# Patient Record
(demographics unavailable — no encounter records)

---

## 2025-02-07 NOTE — PHYSICAL EXAM
[FreeTextEntry1] : Healthy appearing 15 year-old child.  Flat affect Eyes are clear with no sclera abnormalities. External ears, nose and mouth are clear.  Good respiratory effort with no audible wheezing without use of a stethoscope. Ambulates independently with no evidence of antalgia. Good coordination and balance. Able to get on and off exam table without difficulty.  Spine: Inspection of the skin reveals no cafe au lait spots or large birth marks. From behind, patient is well centered with head and shoulders appropriately aligned with pelvis.  Positive poor posture noted on observation however this corrects in the superman position. Left greater than right shoulder asymmetry. Left flank crease noted. On [place] Forward Bending exam there is a right-sided thoracolumbar rib hump deformity noted. Positive increase thoracic kyphotic curve noted.  NTTP over spinous processes and paraspinal musculature. Full range of motion at cervical, thoracic and lumbar spine with no pain or difficulty. No pelvic obliquity. No LLD

## 2025-02-07 NOTE — DATA REVIEWED
[de-identified] : PA Scoliosis x rays were ordered, done and then independently reviewed today OOB on 2/7/25: T3-T9, 22 degrees.  T10-L3, 43 degrees Risser (5).  The triradiate cartilage is closed. Guevara 7. No congenital abnormalities. No Pelvic obliquity.   Lat Scoliosis x rays were ordered, done and then independently reviewed today OOB on 2/7/25: Thoracic kyphotic curvature of 50 deg. No Scheuermann's kyphosis noted. No spondylolisthesis or spondylolysis. No compression fractures or vertebral wedging.   Right bending films T3-T10, 15 degrees with left bending films T3-T10 4 degrees obtained on 2/7/2025  Scoliosis X-rays AP and lateral were ordered, done and then independently reviewed today 10/04/24: X-rays in OOB were obtained. Curvature 45 degrees T9-L2. 69 degrees of thoracic kyphosis. No spondylolisthesis or spondylosis noted. Disc spaces equal throughout the spine.  No pelvic inequity noted. Risser 4   Scoliosis X-rays AP and lateral were ordered, done and then independently reviewed today 4/19/24: X-rays in OOB were obtained. Curvature 39.5 degrees T9-L2. 69 degrees of thoracic kyphosis. No spondylolisthesis or spondylosis noted. Disc spaces equal throughout the spine.  No pelvic inequity noted. Risser 4   Scoliosis X-rays AP and lateral were ordered, done IN BRACE and then independently reviewed today 4/19/24: Curvature 5 degrees T9-L2. 58 degrees of thoracic kyphosis. No spondylolisthesis or spondylosis noted. Disc spaces equal throughout the spine.  No pelvic inequity noted. Risser 4. Adequate correction in brace.   PA scoliosis x rays obtained OOB ordered, done and independently reviewed 12/8/23: T10-L3, 43 deg Right. Risser 4. Guevara 4.  Scoliosis X-rays AP and lateral were ordered, done and then independently reviewed today 10/27/23: X-rays in OOB were obtained. Curvature 46.6 degrees T10-L3. Normal kyphosis and lordosis on lateral films. No spondylolisthesis or spondylosis noted. Disc spaces equal throughout the spine.  No pelvic inequity noted.   Scoliosis X-rays AP and lateral were ordered, done and then independently reviewed today 02/17/2023. X-rays in brace were obtained. Curvature 15.8 degrees in brace. Normal kyphosis and lordosis on lateral films. No spondylolisthesis or spondylosis noted. Disc spaces equal throughout the spine.  No pelvic inequity noted.   My interpretation and review of images taken today, 12/14/2022, in office: AP/Lat scoliosis obtained and reviewed today with family. There is a 32-degree curvature T10-L3 noted on AP films. Risser 0. Guevara 3. Normal kyphosis and lordosis on lateral. No spondylolysis or spondylolisthesis noted.

## 2025-02-07 NOTE — DATA REVIEWED
[de-identified] : PA Scoliosis x rays were ordered, done and then independently reviewed today OOB on 2/7/25: T3-T9, 22 degrees.  T10-L3, 43 degrees Risser (5).  The triradiate cartilage is closed. Guevara 7. No congenital abnormalities. No Pelvic obliquity.   Lat Scoliosis x rays were ordered, done and then independently reviewed today OOB on 2/7/25: Thoracic kyphotic curvature of 50 deg. No Scheuermann's kyphosis noted. No spondylolisthesis or spondylolysis. No compression fractures or vertebral wedging.   Right bending films T3-T10, 15 degrees with left bending films T3-T10 4 degrees obtained on 2/7/2025  Scoliosis X-rays AP and lateral were ordered, done and then independently reviewed today 10/04/24: X-rays in OOB were obtained. Curvature 45 degrees T9-L2. 69 degrees of thoracic kyphosis. No spondylolisthesis or spondylosis noted. Disc spaces equal throughout the spine.  No pelvic inequity noted. Risser 4   Scoliosis X-rays AP and lateral were ordered, done and then independently reviewed today 4/19/24: X-rays in OOB were obtained. Curvature 39.5 degrees T9-L2. 69 degrees of thoracic kyphosis. No spondylolisthesis or spondylosis noted. Disc spaces equal throughout the spine.  No pelvic inequity noted. Risser 4   Scoliosis X-rays AP and lateral were ordered, done IN BRACE and then independently reviewed today 4/19/24: Curvature 5 degrees T9-L2. 58 degrees of thoracic kyphosis. No spondylolisthesis or spondylosis noted. Disc spaces equal throughout the spine.  No pelvic inequity noted. Risser 4. Adequate correction in brace.   PA scoliosis x rays obtained OOB ordered, done and independently reviewed 12/8/23: T10-L3, 43 deg Right. Risser 4. Guevara 4.  Scoliosis X-rays AP and lateral were ordered, done and then independently reviewed today 10/27/23: X-rays in OOB were obtained. Curvature 46.6 degrees T10-L3. Normal kyphosis and lordosis on lateral films. No spondylolisthesis or spondylosis noted. Disc spaces equal throughout the spine.  No pelvic inequity noted.   Scoliosis X-rays AP and lateral were ordered, done and then independently reviewed today 02/17/2023. X-rays in brace were obtained. Curvature 15.8 degrees in brace. Normal kyphosis and lordosis on lateral films. No spondylolisthesis or spondylosis noted. Disc spaces equal throughout the spine.  No pelvic inequity noted.   My interpretation and review of images taken today, 12/14/2022, in office: AP/Lat scoliosis obtained and reviewed today with family. There is a 32-degree curvature T10-L3 noted on AP films. Risser 0. Guevara 3. Normal kyphosis and lordosis on lateral. No spondylolysis or spondylolisthesis noted.

## 2025-02-07 NOTE — ASSESSMENT
[FreeTextEntry1] : Jimmy is a 15 year-old boy with AIS with an operative 43 degree lumbar curve, currently being treated in a low profile TLSO brace.    The condition, natural history, and prognosis were explained to the family. Today's visit included obtaining the history from the child and parent, due to the child's age, the child could not be considered a reliable historian, requiring the parent to act as an independent historian. The clinical findings and images were reviewed with the family. His current curve is measuring 45 degrees, curve progression noted from previous x-rays.  Bracing is no longer an option and is unable to change natural progression.  I recommended surgery to prevent further curve progression. Curves measuring greater than 45 to 50 continue to progress into adult life at about 1-2 per year.   Preoperative and postoperative instructions reviewed. Risks and complications discussed. All questions answered, understanding verbalized. Parent and patient in agreement with plan of care.  Surgery will entail spinal fusion with instrumentation, osteotomies, cell saver, multimodality spinal cord monitoring, bone grafting (autograft/allograft.). Thoracoplasty, navigated guidance vs fluoroscopic guidance, and complex wound closure is planned. Perioperative plan discussed. X-rays of patients operated on by me in the past were shown. All risks and complications including, but not limited to, infection, nonunion, implant failure, surgery, less than full correction, paralysis explained. Wake up test discussed. Transfusion risk discussed. Neuromonitoring explained. Rib resection possibility discussed. Use of fluoroscopy and Air navigation explained infection prevention steps discussed. Post-op pain management protocol discussed. Intraspinal Duramorph discussed. All questions answered. Benefits and alternatives explained. Hospital stay usually is 3-4 days with one night in the PICU. We have implemented a rapid recovery pathway that incorporates microdose of intraspinal Duramorph at the time of surgery, which eliminates the need for opioid PCA and decreases opioids needed post-op to maintain contact. It also decreases constipation rate and allows patients resumption of diet on the same day of surgery. Pain management is in collaboration with pediatric pain specialist. We have decided intraoperative and postoperative pediatric spine team that includes pediatric spine anesthesiologist, neurologist for intraoperative real-time spinal cord monitoring to increase the safety of surgery, dedicated surgical team, pediatric hospitalist, , and  along with child life team. This approach has allowed optimal management of patients, increased surgical safety, decreased risk of blood transfusion, decreased need for opioids and allows them to go home earlier. At discharge, patient is able to walk and climb stairs independently and we arrange for revisiting nurse services for home care. Sutures are dissolvable and wound closure is done by plastic surgery, which decreased risk of infection. We also utilize intraoperative low-dose CT scan to ensure accuracy of implants. In addition, we perform multimodality spinal cord monitoring in real time which is supervised by neurophysiologist and neurologist to decrease the risk of neurological injury and improve safety of the surgical procedure. This approach has improved surgical safety, accuracy, and efficiency, thereby ensuring superior patient outcomes. In addition, the children's [facility] is the only [place] certified children[facility] hospital in [facility], ensuring the highest level of nursing care. Mother wants to proceed with surgery in next June/July 2025.   We also stressed the importance of wearing his brace at least 10 hours a day. We anticipate on doing surgery on 7/15, PSF with instrumentation, likely T4-L3. Heis 5'6" at 144lbs.

## 2025-02-07 NOTE — END OF VISIT
[FreeTextEntry3] :     Saw and examined patient; the above is an accurate documentation of my words and actions.   Barb Muhammad MD [facility] Pediatric Orthopedic Surgery    [Time Spent: ___ minutes] : I have spent [unfilled] minutes of time on the encounter which excludes teaching and separately reported services.

## 2025-02-07 NOTE — END OF VISIT
[FreeTextEntry3] :     Saw and examined patient; the above is an accurate documentation of my words and actions.   Barb Muhammad MD [facility] Pediatric Orthopedic Surgery    [Time Spent: ___ minutes] : I have spent [unfilled] minutes of time on the encounter which excludes teaching and separately reported services.

## 2025-02-07 NOTE — HISTORY OF PRESENT ILLNESS
[FreeTextEntry1] : Jimmy is a 15-year-old young man who returns today for follow up evaluation of scoliosis.  He has previously been seen by my partner Dr. Hartley for scoliosis on October 15, 2020.  At that time, he had a thoracolumbar curvature measuring 16 degrees.  On 12/14/22 he was found to have a 32-degree curve and was indicated for a TLSO brace.   He was last seen on 04/19/2024. He has been wearing TLSO brace approximately 10 hours a day.  He denies back pain. He denies any family history of scoliosis. He denies radiating pain/numbness or tingling, weakness in his upper and lower extremities. He denies urinary/bowel incontinence.  Here today for orthopedic evaluation.  Please refer to last note from previous treatment and further details.  Today, Jimmy presents to the office with his parents for follow-up of scoliosis.  His largest curve was 45 degrees on the previous visit.  He is currently wearing his TLSO brace 10 hours a day with no complications.  He denies back pain.  He presents today for repeat exam and x-rays out of the brace.  He has not worn his brace for over 24 hours.  The patient's HPI was reviewed thoroughly with patient and parent. The patient's parent has acted as an independent historian regarding the above information due to the unreliable nature of the history obtained from the patient.

## 2025-04-16 NOTE — HISTORY OF PRESENT ILLNESS
[FreeTextEntry1] : Jimmy is a 15-year-old young man with history of autism spectrum disorder. He returns today for follow up evaluation of scoliosis.  He is scheduled for posterior spinal fusion with instrumentation on 7/8/2025 with Dr. Muhammad.  He presents today for preoperative evaluation as well as full spine MRI results.  He was previously treated with a TLSO. He denies back pain.  He denies extremity numbness, tingling, weakness, bowel or bladder dysfunction.  He also did preoperative pulmonary function testing but had difficulty with completing test due to his underlying history of autism.

## 2025-04-16 NOTE — DATA REVIEWED
[de-identified] : MRI cervical, thoracic, lumbar spine, independently reviewed : No evidence of intraspinal abnormalities  PA Scoliosis x rays were ordered, done and then independently reviewed today OOB on 2/7/25: T3-T9, 22 degrees.  T10-L3, 45 degrees Risser (5).  The triradiate cartilage is closed. Guevara 7. No congenital abnormalities. No Pelvic obliquity.   Lat Scoliosis x rays were ordered, done and then independently reviewed today OOB on 2/7/25: Thoracic kyphotic curvature of 50 deg. No Scheuermann's kyphosis noted. No spondylolisthesis or spondylolysis. No compression fractures or vertebral wedging.   Right bending films T3-T10, 15 degrees with left bending films T3-T10 4 degrees obtained on 2/7/2025  Scoliosis X-rays AP and lateral were ordered, done and then independently reviewed today 10/04/24: X-rays in OOB were obtained. Curvature 45 degrees T9-L2. 69 degrees of thoracic kyphosis. No spondylolisthesis or spondylosis noted. Disc spaces equal throughout the spine.  No pelvic inequity noted. Risser 4   Scoliosis X-rays AP and lateral were ordered, done and then independently reviewed today 4/19/24: X-rays in OOB were obtained. Curvature 39.5 degrees T9-L2. 69 degrees of thoracic kyphosis. No spondylolisthesis or spondylosis noted. Disc spaces equal throughout the spine.  No pelvic inequity noted. Risser 4   Scoliosis X-rays AP and lateral were ordered, done IN BRACE and then independently reviewed today 4/19/24: Curvature 5 degrees T9-L2. 58 degrees of thoracic kyphosis. No spondylolisthesis or spondylosis noted. Disc spaces equal throughout the spine.  No pelvic inequity noted. Risser 4. Adequate correction in brace.   PA scoliosis x rays obtained OOB ordered, done and independently reviewed 12/8/23: T10-L3, 43 deg Right. Risser 4. Guevara 4.  Scoliosis X-rays AP and lateral were ordered, done and then independently reviewed today 10/27/23: X-rays in OOB were obtained. Curvature 46.6 degrees T10-L3. Normal kyphosis and lordosis on lateral films. No spondylolisthesis or spondylosis noted. Disc spaces equal throughout the spine.  No pelvic inequity noted.   Scoliosis X-rays AP and lateral were ordered, done and then independently reviewed today 02/17/2023. X-rays in brace were obtained. Curvature 15.8 degrees in brace. Normal kyphosis and lordosis on lateral films. No spondylolisthesis or spondylosis noted. Disc spaces equal throughout the spine.  No pelvic inequity noted.   My interpretation and review of images taken today, 12/14/2022, in office: AP/Lat scoliosis obtained and reviewed today with family. There is a 32-degree curvature T10-L3 noted on AP films. Risser 0. Guevara 3. Normal kyphosis and lordosis on lateral. No spondylolysis or spondylolisthesis noted.

## 2025-04-16 NOTE — ASSESSMENT
[FreeTextEntry1] : Jimmy is a 15 year-old boy with AIS with an operative 44 degree lumbar curve, currently being treated in a low profile TLSO brace.    The condition, natural history, and prognosis were explained to the family. Today's visit included obtaining the history from the child and parent, due to the child's age, the child could not be considered a reliable historian, requiring the parent to act as an independent historian. The clinical findings and images were reviewed with the family.   Patient is 15 years of age, and skeletally mature.  Bracing is not effective with curves of this magnitude or after skeletal maturity.  He is scheduled for posterior spinal fusion with instrumentation on 7/8/2025.  This curve will likely continue to progress.  It is already a large curve.  The options of surgery were discussed at length.  Mother does understand curve larger than 50, based on natural history, tend to progress 1-2 /1 in adult life. Curves 90 or more can cause significant cardiac and pulmonary compromise. Large curves are likely at risk for back pain, arthritis in adult life.   Surgical procedure discussed at length. Surgery including spine fusion with instrumentation, osteotomies, Cell Saver, multimodal spinal cord monitoring, bone grafting (autograft/allograft ), thoracoplasty, , and navigated guidance versus fluoroscopic guidance and complex wound closure is planned. Perioperative plan discussed. Wakeup test discussed. Transfusion risk discussed. Neural monitoring explained. Possible need for rib resection has been discussed. Use of fluoroscopy and AIRO navigation explained. Infection prevention steps discussed. Postoperative pain management protocol discussed. Intraspinal Duramorph discussed. Preoperative and postoperative instructions reviewed. Hospital day is usually about 3-4 days with one night in the PICU. We have implemented a rapid recovery pathway that incorporates a micro-dose of intraspinal Duramorph at the time of surgery which eliminate the need for opioid PCA and decreases opioid needs postoperatively for pain control. This also decreases constipation rate and allows patients to  resume diet on the same day of surgery. Pain management is done in collaboration with a pediatric pain specialist. We have a dedicated intraoperative and postoperative pediatric spine team that includes pediatric spine anesthesiologists, neurologist for intraoperative or real-time spinal cord monitoring to increase the safety of surgery, dedicated surgical team, pediatric hospitalist,  and  along with child life therapists. This approach has allowed for optimal management of patient's, increased surgical safety, decrease risk of blood transfusion, decreased need for opioid and allows for patient to be discharged to home earlier. At discharge the patient is able to walk and climb stairs independently. We will arrange for visiting nurse services for home care as needed. Sutures are dissolvable and wound closure was done by plastic surgery which decreases the risk of infection. We also utilized intraoperative low-dose CT scan to ensure accuracy of spinal implants. In addition we perform multimodal spinal cord monitoring and real-time which is supervised by neurophysiologist and neurologists to decrease the risk of neurological injury and improve safety of the surgical procedure. This approach has improved surgical safety, accuracy and efficiency thereby ensuring superior patient now comes. In addition [facility] is the only [place] certified [facility] in [facility],  ensuring the highest level of nursing care.   All the risks and complications of surgery including the risk of infection, nonunion, implant failure, complete paralysis, incomplete paralysis, bladder/bowel paralysis, organ injury, vascular injury, mortality, CSF leak, pleural leak, decompensation, resurgery, extension of fusion, junctional kyphosis, arthritis, organ injury, vascular injury, mortality, screw misplacement, and need for screw removal were explained.  He will return for preoperative consultation with Dr. Muhammad.  All questions were answered.  Understanding verbalized.   We spent 30 minutes on HPI, Clinical exam, ordering/ reviewing all imaging, reviewing any existing record, reviewing findings and counseling patient to treatment, differentials,etiology, prognosis, natural history, implications on ADLs, activities limitations/modifications, genetics, answering questions and addressing concerns, treatment goals and documenting in the EHR.  This note was generated using Dragon medical dictation software. A reasonable effort has been made for proofreading its contents, but typos may still remain. If there are any questions or points of clarification needed please do not hesitate to contact my office.

## 2025-04-16 NOTE — ASSESSMENT
[FreeTextEntry1] : Jimmy is a 15 year-old boy with AIS with an operative 44 degree lumbar curve, currently being treated in a low profile TLSO brace.    The condition, natural history, and prognosis were explained to the family. Today's visit included obtaining the history from the child and parent, due to the child's age, the child could not be considered a reliable historian, requiring the parent to act as an independent historian. The clinical findings and images were reviewed with the family.   Patient is 15 years of age, and skeletally mature.  Bracing is not effective with curves of this magnitude or after skeletal maturity.  He is scheduled for posterior spinal fusion with instrumentation on 7/8/2025.  This curve will likely continue to progress.  It is already a large curve.  The options of surgery were discussed at length.  Mother does understand curve larger than 50, based on natural history, tend to progress 1-2 /1 in adult life. Curves 90 or more can cause significant cardiac and pulmonary compromise. Large curves are likely at risk for back pain, arthritis in adult life.   Surgical procedure discussed at length. Surgery including spine fusion with instrumentation, osteotomies, Cell Saver, multimodal spinal cord monitoring, bone grafting (autograft/allograft ), thoracoplasty, , and navigated guidance versus fluoroscopic guidance and complex wound closure is planned. Perioperative plan discussed. Wakeup test discussed. Transfusion risk discussed. Neural monitoring explained. Possible need for rib resection has been discussed. Use of fluoroscopy and AIRO navigation explained. Infection prevention steps discussed. Postoperative pain management protocol discussed. Intraspinal Duramorph discussed. Preoperative and postoperative instructions reviewed. Hospital day is usually about 3-4 days with one night in the PICU. We have implemented a rapid recovery pathway that incorporates a micro-dose of intraspinal Duramorph at the time of surgery which eliminate the need for opioid PCA and decreases opioid needs postoperatively for pain control. This also decreases constipation rate and allows patients to  resume diet on the same day of surgery. Pain management is done in collaboration with a pediatric pain specialist. We have a dedicated intraoperative and postoperative pediatric spine team that includes pediatric spine anesthesiologists, neurologist for intraoperative or real-time spinal cord monitoring to increase the safety of surgery, dedicated surgical team, pediatric hospitalist,  and  along with child life therapists. This approach has allowed for optimal management of patient's, increased surgical safety, decrease risk of blood transfusion, decreased need for opioid and allows for patient to be discharged to home earlier. At discharge the patient is able to walk and climb stairs independently. We will arrange for visiting nurse services for home care as needed. Sutures are dissolvable and wound closure was done by plastic surgery which decreases the risk of infection. We also utilized intraoperative low-dose CT scan to ensure accuracy of spinal implants. In addition we perform multimodal spinal cord monitoring and real-time which is supervised by neurophysiologist and neurologists to decrease the risk of neurological injury and improve safety of the surgical procedure. This approach has improved surgical safety, accuracy and efficiency thereby ensuring superior patient now comes. In addition [facility] is the only [place] certified [facility] in [facility],  ensuring the highest level of nursing care.   All the risks and complications of surgery including the risk of infection, nonunion, implant failure, complete paralysis, incomplete paralysis, bladder/bowel paralysis, organ injury, vascular injury, mortality, CSF leak, pleural leak, decompensation, resurgery, extension of fusion, junctional kyphosis, arthritis, organ injury, vascular injury, mortality, screw misplacement, and need for screw removal were explained.  He will return for preoperative consultation with Dr. Muhammad.  All questions were answered.  Understanding verbalized.   We spent 30 minutes on HPI, Clinical exam, ordering/ reviewing all imaging, reviewing any existing record, reviewing findings and counseling patient to treatment, differentials,etiology, prognosis, natural history, implications on ADLs, activities limitations/modifications, genetics, answering questions and addressing concerns, treatment goals and documenting in the EHR.  This note was generated using Dragon medical dictation software. A reasonable effort has been made for proofreading its contents, but typos may still remain. If there are any questions or points of clarification needed please do not hesitate to contact my office.

## 2025-04-16 NOTE — END OF VISIT
[Time Spent: ___ minutes] : I have spent [unfilled] minutes of time on the encounter which excludes teaching and separately reported services. [FreeTextEntry3] :     Saw and examined patient; the above is an accurate documentation of my words and actions.   Barb Muhammad MD [facility] Pediatric Orthopedic Surgery

## 2025-04-16 NOTE — DATA REVIEWED
[de-identified] : MRI cervical, thoracic, lumbar spine, independently reviewed : No evidence of intraspinal abnormalities  PA Scoliosis x rays were ordered, done and then independently reviewed today OOB on 2/7/25: T3-T9, 22 degrees.  T10-L3, 45 degrees Risser (5).  The triradiate cartilage is closed. Guevara 7. No congenital abnormalities. No Pelvic obliquity.   Lat Scoliosis x rays were ordered, done and then independently reviewed today OOB on 2/7/25: Thoracic kyphotic curvature of 50 deg. No Scheuermann's kyphosis noted. No spondylolisthesis or spondylolysis. No compression fractures or vertebral wedging.   Right bending films T3-T10, 15 degrees with left bending films T3-T10 4 degrees obtained on 2/7/2025  Scoliosis X-rays AP and lateral were ordered, done and then independently reviewed today 10/04/24: X-rays in OOB were obtained. Curvature 45 degrees T9-L2. 69 degrees of thoracic kyphosis. No spondylolisthesis or spondylosis noted. Disc spaces equal throughout the spine.  No pelvic inequity noted. Risser 4   Scoliosis X-rays AP and lateral were ordered, done and then independently reviewed today 4/19/24: X-rays in OOB were obtained. Curvature 39.5 degrees T9-L2. 69 degrees of thoracic kyphosis. No spondylolisthesis or spondylosis noted. Disc spaces equal throughout the spine.  No pelvic inequity noted. Risser 4   Scoliosis X-rays AP and lateral were ordered, done IN BRACE and then independently reviewed today 4/19/24: Curvature 5 degrees T9-L2. 58 degrees of thoracic kyphosis. No spondylolisthesis or spondylosis noted. Disc spaces equal throughout the spine.  No pelvic inequity noted. Risser 4. Adequate correction in brace.   PA scoliosis x rays obtained OOB ordered, done and independently reviewed 12/8/23: T10-L3, 43 deg Right. Risser 4. Guevara 4.  Scoliosis X-rays AP and lateral were ordered, done and then independently reviewed today 10/27/23: X-rays in OOB were obtained. Curvature 46.6 degrees T10-L3. Normal kyphosis and lordosis on lateral films. No spondylolisthesis or spondylosis noted. Disc spaces equal throughout the spine.  No pelvic inequity noted.   Scoliosis X-rays AP and lateral were ordered, done and then independently reviewed today 02/17/2023. X-rays in brace were obtained. Curvature 15.8 degrees in brace. Normal kyphosis and lordosis on lateral films. No spondylolisthesis or spondylosis noted. Disc spaces equal throughout the spine.  No pelvic inequity noted.   My interpretation and review of images taken today, 12/14/2022, in office: AP/Lat scoliosis obtained and reviewed today with family. There is a 32-degree curvature T10-L3 noted on AP films. Risser 0. Guevara 3. Normal kyphosis and lordosis on lateral. No spondylolysis or spondylolisthesis noted.

## 2025-04-16 NOTE — END OF VISIT
[Time Spent: ___ minutes] : I have spent [unfilled] minutes of time on the encounter which excludes teaching and separately reported services. [FreeTextEntry3] :     Saw and examined patient; the above is an accurate documentation of my words and actions.   Barb Muhammad MD [facility] Pediatric Orthopedic Surgery